# Patient Record
Sex: FEMALE | Race: OTHER
[De-identification: names, ages, dates, MRNs, and addresses within clinical notes are randomized per-mention and may not be internally consistent; named-entity substitution may affect disease eponyms.]

---

## 2020-05-13 ENCOUNTER — HOSPITAL ENCOUNTER (OUTPATIENT)
Dept: HOSPITAL 62 - OROUT | Age: 40
Discharge: HOME | End: 2020-05-13
Attending: INTERNAL MEDICINE
Payer: OTHER GOVERNMENT

## 2020-05-13 VITALS — DIASTOLIC BLOOD PRESSURE: 84 MMHG | SYSTOLIC BLOOD PRESSURE: 116 MMHG

## 2020-05-13 DIAGNOSIS — K64.8: ICD-10-CM

## 2020-05-13 DIAGNOSIS — K52.9: Primary | ICD-10-CM

## 2020-05-13 PROCEDURE — 45380 COLONOSCOPY AND BIOPSY: CPT

## 2020-05-13 PROCEDURE — 00811 ANES LWR INTST NDSC NOS: CPT

## 2020-05-13 PROCEDURE — 88305 TISSUE EXAM BY PATHOLOGIST: CPT

## 2020-05-13 NOTE — OPERATIVE REPORT
Operative Report


DATE OF SURGERY: 05/13/20


Operative Report: 





The risk, benefits and alternatives of the procedure including the risk of 

bleeding, perforation requiring surgery have been explained to the patient in 

detail and informed consent has been obtained.  Patient is taken back to the 

operating room and placed in a left, lateral decubital position.  Timeout was 

called.  Propofol medication is administered.  Rectal examination is done which 

did not reveal any masses, tears or fissures.  An Olympus videoscope was 

introduced into the patient's rectum.  Scope was then carefully advanced all the

way to the cecum.  The cecum was identified by the usual anatomical landmarks 

including the ileocecal valve as well as the appendiceal office.  

Photodocumentation is obtained.  Scope was then sequentially pulled back via the

various segments of the colon including the ascending colon, hepatic flexure, 

transverse colon, splenic flexure, descending colon finally into the 

rectosigmoid portions of the colon.  Retroflexion maneuver is performed.


PREOPERATIVE DIAGNOSIS: Change in bowel habits


POSTOPERATIVE DIAGNOSIS: Right side colon Inflammation status post biopsy.  

Internal hemorrhoids


OPERATION: Colonoscopy with biopsy


SURGEON: HERON ZHENG


ANESTHESIA: LMAC


TISSUE REMOVED OR ALTERED: As noted above.


COMPLICATIONS: 





None.


ESTIMATED BLOOD LOSS: None.


INTRAOPERATIVE FINDINGS: As noted above.


PROCEDURE: 





Patient tolerated the procedure well.


No immediate postprocedure complications are noted.


Patient is discharged in good condition.


Discharge date 5/13/2020.


Discharge diet: Regular.


Discharge activity: Regular.


2 to 3-week follow-up to discuss findings.  Patient is instructed to call the 

office or proceed to the emergency room should there be any further problems 

questions.





Wait on the pathology.